# Patient Record
Sex: FEMALE
[De-identification: names, ages, dates, MRNs, and addresses within clinical notes are randomized per-mention and may not be internally consistent; named-entity substitution may affect disease eponyms.]

---

## 2023-01-25 ENCOUNTER — DASHBOARD ENCOUNTERS (OUTPATIENT)
Age: 43
End: 2023-01-25

## 2023-01-25 ENCOUNTER — WEB ENCOUNTER (OUTPATIENT)
Dept: URBAN - METROPOLITAN AREA CLINIC 92 | Facility: CLINIC | Age: 43
End: 2023-01-25

## 2023-01-25 ENCOUNTER — OFFICE VISIT (OUTPATIENT)
Dept: URBAN - METROPOLITAN AREA CLINIC 92 | Facility: CLINIC | Age: 43
End: 2023-01-25
Payer: COMMERCIAL

## 2023-01-25 VITALS
HEIGHT: 59 IN | SYSTOLIC BLOOD PRESSURE: 108 MMHG | WEIGHT: 102.2 LBS | TEMPERATURE: 97 F | HEART RATE: 71 BPM | BODY MASS INDEX: 20.6 KG/M2 | DIASTOLIC BLOOD PRESSURE: 73 MMHG

## 2023-01-25 DIAGNOSIS — K92.1 BLOOD IN STOOL: ICD-10-CM

## 2023-01-25 DIAGNOSIS — R10.13 EPIGASTRIC ABDOMINAL PAIN: ICD-10-CM

## 2023-01-25 DIAGNOSIS — K64.9 HEMORRHOIDS, UNSPECIFIED HEMORRHOID TYPE: ICD-10-CM

## 2023-01-25 DIAGNOSIS — R10.31 RLQ ABDOMINAL PAIN: ICD-10-CM

## 2023-01-25 PROCEDURE — 99244 OFF/OP CNSLTJ NEW/EST MOD 40: CPT | Performed by: INTERNAL MEDICINE

## 2023-01-25 PROCEDURE — 99204 OFFICE O/P NEW MOD 45 MIN: CPT | Performed by: INTERNAL MEDICINE

## 2023-01-25 NOTE — HPI-TODAY'S VISIT:
This is a 42-year-old female presents today for consultation.  She is referred by Dr. Saul Kenny.  A copy this report will be sent to his office.  Patient notes that for 1 to 2 years she has had intermittent right lower quadrant abdominal pain.  There is no obvious exacerbating or alleviating factors.  She also notes an epigastric abdominal pain with some early satiety.  She does have predominantly constipation with a bowel movement every 3 days.  She does have occasional diarrhea.  She does have issues with hemorrhoids and rectal bleeding as well.  No prior endoscopic evaluation.  She is not on any bowel regimen.

## 2023-04-03 ENCOUNTER — OFFICE VISIT (OUTPATIENT)
Dept: URBAN - METROPOLITAN AREA MEDICAL CENTER 29 | Facility: MEDICAL CENTER | Age: 43
End: 2023-04-03
Payer: COMMERCIAL

## 2023-04-03 DIAGNOSIS — K29.60 ADENOPAPILLOMATOSIS GASTRICA: ICD-10-CM

## 2023-04-03 DIAGNOSIS — K62.5 ANAL BLEEDING: ICD-10-CM

## 2023-04-03 PROCEDURE — 45378 DIAGNOSTIC COLONOSCOPY: CPT | Performed by: INTERNAL MEDICINE

## 2023-04-03 PROCEDURE — 43239 EGD BIOPSY SINGLE/MULTIPLE: CPT | Performed by: INTERNAL MEDICINE
